# Patient Record
Sex: FEMALE | URBAN - METROPOLITAN AREA
[De-identification: names, ages, dates, MRNs, and addresses within clinical notes are randomized per-mention and may not be internally consistent; named-entity substitution may affect disease eponyms.]

---

## 2020-12-10 ENCOUNTER — OFFICE VISIT (OUTPATIENT)
Dept: URBAN - METROPOLITAN AREA CLINIC 81 | Facility: CLINIC | Age: 65
End: 2020-12-10

## 2020-12-10 DIAGNOSIS — H52.4 PRESBYOPIA: Primary | ICD-10-CM

## 2020-12-10 DIAGNOSIS — H25.813 COMBINED FORMS OF AGE-RELATED CATARACT, BILATERAL: ICD-10-CM

## 2020-12-10 PROCEDURE — 92004 COMPRE OPH EXAM NEW PT 1/>: CPT | Performed by: OPTOMETRIST

## 2020-12-10 ASSESSMENT — KERATOMETRY
OS: 45.25
OD: 45.25

## 2020-12-10 ASSESSMENT — VISUAL ACUITY
OD: 20/20
OS: 20/20

## 2020-12-10 ASSESSMENT — INTRAOCULAR PRESSURE
OD: 15
OS: 14

## 2020-12-10 NOTE — IMPRESSION/PLAN
Impression: Presbyopia: H52.4.

 - Pt wants -0.49 OD for simplicity 
 - new ctl rx given Plan: New spec Rx given - Discussed changes and possible adaptation period. 

RTC 1 year/PRN

## 2024-08-15 ENCOUNTER — OFFICE VISIT (OUTPATIENT)
Dept: URBAN - METROPOLITAN AREA CLINIC 81 | Facility: CLINIC | Age: 69
End: 2024-08-15
Payer: COMMERCIAL

## 2024-08-15 DIAGNOSIS — H52.4 PRESBYOPIA: Primary | ICD-10-CM

## 2024-08-15 PROCEDURE — 92310 CONTACT LENS FITTING OU: CPT | Performed by: OPTOMETRIST

## 2024-08-15 PROCEDURE — 92004 COMPRE OPH EXAM NEW PT 1/>: CPT | Performed by: OPTOMETRIST

## 2024-08-15 ASSESSMENT — KERATOMETRY
OS: 45.00
OD: 44.88

## 2024-08-15 ASSESSMENT — VISUAL ACUITY
OS: 20/20
OD: 20/20

## 2024-08-15 ASSESSMENT — INTRAOCULAR PRESSURE
OS: 15
OD: 14